# Patient Record
Sex: MALE | Race: WHITE | NOT HISPANIC OR LATINO | Employment: FULL TIME | URBAN - METROPOLITAN AREA
[De-identification: names, ages, dates, MRNs, and addresses within clinical notes are randomized per-mention and may not be internally consistent; named-entity substitution may affect disease eponyms.]

---

## 2021-06-30 ENCOUNTER — THERAPY VISIT (OUTPATIENT)
Dept: PHYSICAL THERAPY | Facility: CLINIC | Age: 31
End: 2021-06-30
Payer: OTHER MISCELLANEOUS

## 2021-06-30 DIAGNOSIS — M54.50 RIGHT-SIDED LOW BACK PAIN WITHOUT SCIATICA: Primary | ICD-10-CM

## 2021-06-30 PROCEDURE — 97110 THERAPEUTIC EXERCISES: CPT | Mod: GP | Performed by: PHYSICAL THERAPIST

## 2021-06-30 PROCEDURE — 97161 PT EVAL LOW COMPLEX 20 MIN: CPT | Mod: GP | Performed by: PHYSICAL THERAPIST

## 2021-06-30 PROCEDURE — 97530 THERAPEUTIC ACTIVITIES: CPT | Mod: GP | Performed by: PHYSICAL THERAPIST

## 2021-06-30 NOTE — LETTER
[facility]  [id number] [facility]  [address]  [place] [address]  [phone]    2021    Re: Helio Fuentes   :   1990  MRN:  [id number]   REFERRING PHYSICIAN:   Fabi AYERS [facility] [place]    Date of Initial Evaluation:  2021  Visits:  Rxs Used: 1  Reason for Referral:  Right-sided low back pain without sciatica    EVALUATION SUMMARY      [facility] Initial Evaluation  2021     Precautions/Restrictions/MD instructions: eval and treat    Therapist Assessment: Helio Fuentes is a 30 year old male patient presenting to Physical Therapy with R sided LBP. Patient demonstrates decreased lumbar flexion with peripheralization of symptoms with repetitions, decreased lumbar extension with centralization of symptoms with increase AROM with repetition, incresaed TTP with bilateral QL (R>L), - slump, - SLR, increased stiffness noted L4-L5 with BPA, and decreased glute med and glute max strength bilaterally. Signs and symptoms are consistent with mechanical low back pain. These impairments limit their ability to lift, bend, sit without pain. Skilled PT services are necessary in order to reduce impairments and improve independent function.    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 Denies numbness, tingling, changes in sensation. Denies having related symptoms spreading to the R buttock, R post thigh and R calf.    Worst Pain 10/10 (with with original injury)   Symptom  Provocators Sitting, bending over, lifting    Best Pain 5/10    Symptom Relievers Standing, walking,    Time of day dependent? Worse in morning   Recent symptom change? 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    Usual physical activities  (within past year) Bags, lifting for work, walking dog    Orthopaedic History  None   Medication  Pain medication    Notable medical history High blood pressure    Patient goals Decrease pain, increase sitting tolerance    Patient Reported Health good     Red Flags: (Bold when present) - reviewed the following and denies  Cauda equina: progressive motor or sensory loss, urinary retention or overflow incontinence, new fecal incontinence, saddle anesthesia, significant motor deficits encompassing multiple nerve roots  Fracture: Significant trauma, prolonged corticosteroid use, osteoporosis, age >70  Infection: spinal procedure in the last 12 months, IV drug use, immunosuppression, fever, wound in spinal region, generally unwell  Malignancy: history of metastatic cancer, unexplained weight loss        PHYSICAL THERAPY SPINE EXAMINATION    Dynamic Movement Screen:  2 leg stance: decreased lumbar lordosis   2 leg squat:Excessive anterior knee excursion (reduced posterior hip excursion) and Femoral ADD/IR noted at bilaterally limb(s)  Spine rotation in stance: poor segmental dissociation noted with rotation bilaterally     1 leg stance:   Right: proprioceptive challenge and excessive lateral trunk lean over stance limb  Left: proprioceptive challenge and excessive lateral trunk lean over stance limb    Gait: slow alyssa    Lumbar & Thoracic Spine ROM Screen   RIGHT LEFT   Standing Lumbar Spine ROM   Flexion Mod loss + pain   Extension Mod loss + pain   Sidebend Nil loss  Nil  loss n   Seated Thoracic Spine ROM   Rotation Nil loss  Nil loss        Repeated Movement Testing  Baseline symptoms seated: 5/10    Symptoms During Symptoms After ROM difference   Flexion Standing Produces No Worse         Repeated Peripheralising Worse decreased   Extension Standing Increases No Effect,            Repeated Decreases Better and Centralized increased   SUSPECTED DIRECTIONAL PREFERENCE: extension     Sacroiliac Joint Provocative Testing: - thigh thrust, - sacral thrust, - distraction     Passive Joint Mobility Screen: increased stiffness noted L4-L5    Tender to palpation at the following structures: bilateral QL R>L   NOT tender to palpation at the following structures: thoracic paraspinals      Motor Deficit:  Myotomes L R   L1-2 (hip flexion) 5/5 5/5   L3 (knee extension) 5/5 5/5   L4 (ankle DF) 5/5 5/5   L5 (g. toe ext) 5/5 5/5   S1 (ankle PF or knee flex) 5/5 5/5   (* = patient s pain)  Sensory Exam: SILT and symmetrical for bilateral LE's. Reflexes 2+ and symmetrical for bilateral patellar and Achilles.     Lower Extremity Muscle Strength (x/5)   Left Right   Hip Flex 5/5 5/5   Hip ER 4-/5 4-/5   Hip IR 4/5 4/5   Hip ABD 4-/5 3+/5   Hip ADD 4/5 4/5   Hip Ext 3+/5 3+/5   Knee Flex 4/5 4/5       Lower Extremity Neural System Examination   Right Left   NEURAL TENSION     Seated Slump Test - -   Supine SLR Test (Sciatic n.) - -   Prone KF (Femoral n.) - -     Lower Extremity Flexibility Screen:   Right Left   Hamstring ++ ++   COLBY - -   Goalie Stretch - -   Hip Flexor + +   ITB/Lat Hip in SL - -   Quadriceps - -   Gastroc/ Soleus - -   - = normal  + = mild tightness  ++ = moderate tightness  +++ = significant tightness    ASSESSMENT/PLAN:  The patient is a 30 year old male with chief complaint of R sided LBP.    The patient has the following significant findings with corresponding treatment plan.  Diagnosis 1:  Signs and symptoms consistent with mechanical low back pain    Pain -  manual therapy,  self management, education and home program  Decreased ROM/flexibility - manual therapy, therapeutic exercise and home program  Decreased joint mobility - manual therapy, therapeutic exercise and home program  Decreased strength - therapeutic exercise, therapeutic activities and home program  Impaired balance - neuro re-education, therapeutic activities and home program  Decreased proprioception - neuro re-education and therapeutic activities  Impaired gait - gait training and assistive devices  Impaired muscle performance - neuro re-education and home program  Decreased function - therapeutic activities and home program  Impaired posture - neuro re-education, therapeutic activities and home program  Instability -  Therapeutic Activity, Therapeutic Exercise, Neuromuscular Re-education, Splinting/Taping/Bracing/Orthotic, home program    Therapy Evaluation Codes:   1) History comprised of:   Personal factors that impact the plan of care:      None.    Comorbidity factors that impact the plan of care are:      None.     Medications impacting care: Pain.  2) Examination of Body Systems comprised of:   Body structures and functions that impact the plan of care:      Lumbar spine.   Activity limitations that impact the plan of care are:      Bending, Lifting and Sitting.  3) Clinical presentation characteristics are:   Stable/Uncomplicated.  4) Decision-Making    Low complexity using standardized patient assessment instrument and/or measureable assessment of functional outcome.  Cumulative Therapy Evaluation is: Low complexity.    Previous and current functional limitations:  (See Goal Flow Sheet for this information)    Short term and Long term goals: (See Goal Flow Sheet for this information)     Communication ability:  Patient appears to be able to clearly communicate and understand verbal and written communication and follow directions correctly.  Treatment Explanation - The following has been discussed with the  patient:   RX ordered/plan of care  Anticipated outcomes  Possible risks and side effects  This patient would benefit from PT intervention to resume normal activities.   Rehab potential is good.    Frequency:  1 X week, once daily  Duration:  for 8 visits  Discharge Plan: Achieve all LTGs, be independent in home treatment program, and reach maximal therapeutic benefit.    Thank you for your referral.    INQUIRIES  Therapist: Tracy AYERS [facility] [place]  [address] [place] [address] [address]  [facility] [address]  Phone: [phone]  Fax: [phone]

## 2021-06-30 NOTE — PROGRESS NOTES
[facility] Initial Evaluation  6/30/2021     Precautions/Restrictions/MD instructions: eval and treat    Therapist Assessment: Helio Fuentes is a 30 year old male patient presenting to Physical Therapy with R sided LBP. Patient demonstrates decreased lumbar flexion with peripheralization of symptoms with repetitions, decreased lumbar extension with centralization of symptoms with increase AROM with repetition, incresaed TTP with bilateral QL (R>L), - slump, - SLR, increased stiffness noted L4-L5 with BPA, and decreased glute med and glute max strength bilaterally. Signs and symptoms are consistent with mechanical low back pain. These impairments limit their ability to lift, bend, sit without pain. Skilled PT services are necessary in order to reduce impairments and improve independent function.    Subjective History    Injury/Condition Details:  Presenting Complaint LBP   Onset Timing/Date 6/3/2021   (Doctor's referral 6/23/2021)   Mechanism Carrying wheelbarrow of concrete and missed a step landing in standing in a side bend      Symptom Behavior Details    Primary Symptoms Constant symptoms; worsen with activity, pain (Location: R LBP around L4 level, Quality: Aching/Throbbing and Tender)      Denies locking, catching, giving way, or instability. Denies numbness, tingling, changes in sensation. Denies having related symptoms spreading to the R buttock, R post thigh and R calf.    Worst Pain 10/10 (with with original injury)   Symptom Provocators Sitting, bending over, lifting    Best Pain 5/10    Symptom Relievers Standing, walking,    Time of day dependent? Worse in morning   Recent symptom change? 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    Usual physical  activities  (within past year) Bags, lifting for work, walking dog    Orthopaedic History  None   Medication  Pain medication    Notable medical history High blood pressure    Patient goals Decrease pain, increase sitting tolerance    Patient Reported Health good     Red Flags: (Bold when present) - reviewed the following and denies  Cauda equina: progressive motor or sensory loss, urinary retention or overflow incontinence, new fecal incontinence, saddle anesthesia, significant motor deficits encompassing multiple nerve roots  Fracture: Significant trauma, prolonged corticosteroid use, osteoporosis, age >70  Infection: spinal procedure in the last 12 months, IV drug use, immunosuppression, fever, wound in spinal region, generally unwell  Malignancy: history of metastatic cancer, unexplained weight loss        PHYSICAL THERAPY SPINE EXAMINATION    Dynamic Movement Screen:  2 leg stance: decreased lumbar lordosis   2 leg squat:Excessive anterior knee excursion (reduced posterior hip excursion) and Femoral ADD/IR noted at bilaterally limb(s)  Spine rotation in stance: poor segmental dissociation noted with rotation bilaterally     1 leg stance:   Right: proprioceptive challenge and excessive lateral trunk lean over stance limb  Left: proprioceptive challenge and excessive lateral trunk lean over stance limb    Gait: slow alyssa    Lumbar & Thoracic Spine ROM Screen   RIGHT LEFT   Standing Lumbar Spine ROM   Flexion Mod loss + pain   Extension Mod loss + pain   Sidebend Nil loss  Nil loss n   Seated Thoracic Spine ROM   Rotation Nil loss  Nil loss        Repeated Movement Testing  Baseline symptoms seated: 5/10    Symptoms During Symptoms After ROM difference   Flexion Standing Produces No Worse         Repeated Peripheralising Worse decreased   Extension Standing Increases No Effect,            Repeated Decreases Better and Centralized increased   SUSPECTED DIRECTIONAL PREFERENCE: extension     Sacroiliac Joint  Provocative Testing: - thigh thrust, - sacral thrust, - distraction     Passive Joint Mobility Screen: increased stiffness noted L4-L5    Tender to palpation at the following structures: bilateral QL R>L   NOT tender to palpation at the following structures: thoracic paraspinals      Motor Deficit:  Myotomes L R   L1-2 (hip flexion) 5/5 5/5   L3 (knee extension) 5/5 5/5   L4 (ankle DF) 5/5 5/5   L5 (g. toe ext) 5/5 5/5   S1 (ankle PF or knee flex) 5/5 5/5   (* = patient s pain)  Sensory Exam: SILT and symmetrical for bilateral LE's. Reflexes 2+ and symmetrical for bilateral patellar and Achilles.     Lower Extremity Muscle Strength (x/5)   Left Right   Hip Flex 5/5 5/5   Hip ER 4-/5 4-/5   Hip IR 4/5 4/5   Hip ABD 4-/5 3+/5   Hip ADD 4/5 4/5   Hip Ext 3+/5 3+/5   Knee Flex 4/5 4/5       Lower Extremity Neural System Examination   Right Left   NEURAL TENSION     Seated Slump Test - -   Supine SLR Test (Sciatic n.) - -   Prone KF (Femoral n.) - -     Lower Extremity Flexibility Screen:   Right Left   Hamstring ++ ++   COLBY - -   Goalie Stretch - -   Hip Flexor + +   ITB/Lat Hip in SL - -   Quadriceps - -   Gastroc/ Soleus - -   - = normal  + = mild tightness  ++ = moderate tightness  +++ = significant tightness    ASSESSMENT/PLAN:  The patient is a 30 year old male with chief complaint of R sided LBP.    The patient has the following significant findings with corresponding treatment plan.  Diagnosis 1:  Signs and symptoms consistent with mechanical low back pain    Pain -  manual therapy, self management, education and home program  Decreased ROM/flexibility - manual therapy, therapeutic exercise and home program  Decreased joint mobility - manual therapy, therapeutic exercise and home program  Decreased strength - therapeutic exercise, therapeutic activities and home program  Impaired balance - neuro re-education, therapeutic activities and home program  Decreased proprioception - neuro re-education and therapeutic  activities  Impaired gait - gait training and assistive devices  Impaired muscle performance - neuro re-education and home program  Decreased function - therapeutic activities and home program  Impaired posture - neuro re-education, therapeutic activities and home program  Instability -  Therapeutic Activity, Therapeutic Exercise, Neuromuscular Re-education, Splinting/Taping/Bracing/Orthotic, home program    Therapy Evaluation Codes:   1) History comprised of:   Personal factors that impact the plan of care:      None.    Comorbidity factors that impact the plan of care are:      None.     Medications impacting care: Pain.  2) Examination of Body Systems comprised of:   Body structures and functions that impact the plan of care:      Lumbar spine.   Activity limitations that impact the plan of care are:      Bending, Lifting and Sitting.  3) Clinical presentation characteristics are:   Stable/Uncomplicated.  4) Decision-Making    Low complexity using standardized patient assessment instrument and/or measureable assessment of functional outcome.  Cumulative Therapy Evaluation is: Low complexity.    Previous and current functional limitations:  (See Goal Flow Sheet for this information)    Short term and Long term goals: (See Goal Flow Sheet for this information)     Communication ability:  Patient appears to be able to clearly communicate and understand verbal and written communication and follow directions correctly.  Treatment Explanation - The following has been discussed with the patient:   RX ordered/plan of care  Anticipated outcomes  Possible risks and side effects  This patient would benefit from PT intervention to resume normal activities.   Rehab potential is good.    Frequency:  1 X week, once daily  Duration:  for 8 visits  Discharge Plan: Achieve all LTGs, be independent in home treatment program, and reach maximal therapeutic benefit.    Please refer to the daily flowsheet for treatment today, total  treatment time and time spent performing 1:1 timed codes.

## 2021-07-08 ENCOUNTER — THERAPY VISIT (OUTPATIENT)
Dept: PHYSICAL THERAPY | Facility: CLINIC | Age: 31
End: 2021-07-08
Payer: OTHER MISCELLANEOUS

## 2021-07-08 DIAGNOSIS — M54.50 RIGHT-SIDED LOW BACK PAIN WITHOUT SCIATICA: ICD-10-CM

## 2021-07-08 PROCEDURE — 97140 MANUAL THERAPY 1/> REGIONS: CPT | Mod: GP | Performed by: PHYSICAL THERAPIST

## 2021-07-08 PROCEDURE — 97110 THERAPEUTIC EXERCISES: CPT | Mod: GP | Performed by: PHYSICAL THERAPIST

## 2021-07-15 ENCOUNTER — THERAPY VISIT (OUTPATIENT)
Dept: PHYSICAL THERAPY | Facility: CLINIC | Age: 31
End: 2021-07-15
Payer: OTHER MISCELLANEOUS

## 2021-07-15 DIAGNOSIS — M54.50 RIGHT-SIDED LOW BACK PAIN WITHOUT SCIATICA: ICD-10-CM

## 2021-07-15 PROCEDURE — 97110 THERAPEUTIC EXERCISES: CPT | Mod: GP | Performed by: PHYSICAL THERAPIST

## 2021-07-15 PROCEDURE — 97140 MANUAL THERAPY 1/> REGIONS: CPT | Mod: GP | Performed by: PHYSICAL THERAPIST

## 2021-08-12 ENCOUNTER — THERAPY VISIT (OUTPATIENT)
Dept: PHYSICAL THERAPY | Facility: CLINIC | Age: 31
End: 2021-08-12
Payer: OTHER MISCELLANEOUS

## 2021-08-12 DIAGNOSIS — M54.50 RIGHT-SIDED LOW BACK PAIN WITHOUT SCIATICA: ICD-10-CM

## 2021-08-12 PROCEDURE — 97110 THERAPEUTIC EXERCISES: CPT | Mod: GP | Performed by: PHYSICAL THERAPIST

## 2021-08-12 NOTE — PROGRESS NOTES
"Discharge Note  Discharge note reporting period is from initial eval to Aug 12, 2021.       Patient seen for 4 visits.  SUBJECTIVE  Subjective changes noted by patient:  Patient states that he is back to full work duties without pain. He sometimes \"feels\" his back, but it's never painful   .  Current pain level is 0/10.     Previous pain level was  10/10.   Changes in function:  Yes (See Goal flowsheet attached for changes in current functional level)  Adverse reaction to treatment or activity: None    OBJECTIVE  Changes noted in objective findings: Lumbar AROM: full and pain free      ASSESSMENT/PLAN      DIAGP:  The encounter diagnosis was Right-sided low back pain without sciatica.  Updated problem list and treatment plan:     Pain - HEP  Decreased ROM/flexibility - HEP  Decreased function - HEP  Decreased strength - HEP  Self Management Plans:  HEP  STG/LTGs have been met or progress has been made towards goals:  Yes, please see goal flowsheet for most current information  Assessment of Progress: ready for discharge     Patient is ready to discharge from physical therapy as Helio met all STG and LTG's for physical therapy. Patient will continue to work on good lumbar health at home with independent use of HEP. Patient verbally agrees with plan.    Recommendations:  Discharge with current home program.  Patient to follow up with PT as needed.    Please refer to the daily flowsheet for treatment today, total treatment time and time spent performing 1:1 timed codes.    "